# Patient Record
Sex: FEMALE | Race: WHITE | NOT HISPANIC OR LATINO | Employment: OTHER | URBAN - METROPOLITAN AREA
[De-identification: names, ages, dates, MRNs, and addresses within clinical notes are randomized per-mention and may not be internally consistent; named-entity substitution may affect disease eponyms.]

---

## 2023-04-10 ENCOUNTER — TELEPHONE (OUTPATIENT)
Dept: PRIMARY CARE | Facility: CLINIC | Age: 48
End: 2023-04-10
Payer: COMMERCIAL

## 2023-04-10 NOTE — TELEPHONE ENCOUNTER
Patient left a message- patient woke up with intense right breast pain. It still hurts to touch and painful. Patient did have left breast cancer and would like to have this examed for a lump. Please advise tn

## 2023-06-12 DIAGNOSIS — I10 ESSENTIAL HYPERTENSION: Primary | ICD-10-CM

## 2023-06-12 PROBLEM — E87.6 HYPOKALEMIA: Status: ACTIVE | Noted: 2023-06-12

## 2023-06-12 PROBLEM — Z20.822 EXPOSURE TO COVID-19 VIRUS: Status: ACTIVE | Noted: 2023-06-12

## 2023-06-12 PROBLEM — R93.89 ABNORMAL MRI: Status: ACTIVE | Noted: 2023-06-12

## 2023-06-12 PROBLEM — E07.89 THYROID FULLNESS: Status: ACTIVE | Noted: 2023-06-12

## 2023-06-12 PROBLEM — F41.0 PANIC ATTACKS: Status: ACTIVE | Noted: 2023-06-12

## 2023-06-12 PROBLEM — R92.8 MAMMOGRAM ABNORMAL: Status: ACTIVE | Noted: 2023-06-12

## 2023-06-12 PROBLEM — E78.00 ELEVATED LDL CHOLESTEROL LEVEL: Status: ACTIVE | Noted: 2023-06-12

## 2023-06-12 PROBLEM — R63.5 ABNORMAL WEIGHT GAIN: Status: ACTIVE | Noted: 2023-06-12

## 2023-06-12 PROBLEM — Z12.11 COLON CANCER SCREENING: Status: ACTIVE | Noted: 2023-06-12

## 2023-06-12 PROBLEM — R92.30 DENSE BREAST TISSUE ON MAMMOGRAM: Status: ACTIVE | Noted: 2023-06-12

## 2023-06-12 PROBLEM — E03.9 HYPOTHYROIDISM: Status: ACTIVE | Noted: 2023-06-12

## 2023-06-12 PROBLEM — R05.9 COUGH: Status: ACTIVE | Noted: 2023-06-12

## 2023-06-12 PROBLEM — D05.10 DCIS (DUCTAL CARCINOMA IN SITU): Status: ACTIVE | Noted: 2023-06-12

## 2023-06-12 PROBLEM — S33.6XXA SPRAIN, SACROILIAC: Status: ACTIVE | Noted: 2023-06-12

## 2023-06-12 PROBLEM — H04.123 DRY EYE SYNDROME OF BOTH EYES: Status: ACTIVE | Noted: 2023-06-12

## 2023-06-12 PROBLEM — E55.9 VITAMIN D DEFICIENCY: Status: ACTIVE | Noted: 2023-06-12

## 2023-06-12 PROBLEM — H81.10 VERTIGO, BENIGN POSITIONAL: Status: ACTIVE | Noted: 2023-06-12

## 2023-06-12 RX ORDER — LOSARTAN POTASSIUM 100 MG/1
1 TABLET ORAL DAILY
COMMUNITY
Start: 2021-08-16 | End: 2023-06-12 | Stop reason: DRUGHIGH

## 2023-06-12 RX ORDER — LEVOTHYROXINE SODIUM 50 UG/1
50 TABLET ORAL DAILY
COMMUNITY
End: 2023-10-09

## 2023-06-12 RX ORDER — HYDROCHLOROTHIAZIDE 50 MG/1
1 TABLET ORAL DAILY
COMMUNITY
Start: 2021-05-22 | End: 2023-08-24 | Stop reason: ALTCHOICE

## 2023-06-12 RX ORDER — ACETAMINOPHEN 500 MG
1 TABLET ORAL DAILY
COMMUNITY
Start: 2019-02-18

## 2023-06-12 RX ORDER — LEVOTHYROXINE SODIUM 25 UG/1
25 TABLET ORAL DAILY
COMMUNITY
Start: 2022-09-19 | End: 2023-08-24 | Stop reason: ALTCHOICE

## 2023-06-12 RX ORDER — ALBUTEROL SULFATE 90 UG/1
2 AEROSOL, METERED RESPIRATORY (INHALATION) EVERY 4 HOURS PRN
COMMUNITY
Start: 2023-01-04 | End: 2023-08-24 | Stop reason: ALTCHOICE

## 2023-06-12 RX ORDER — ASCORBIC ACID 500 MG
1 TABLET,CHEWABLE ORAL 2 TIMES DAILY
COMMUNITY
Start: 2019-02-18 | End: 2023-08-24 | Stop reason: ALTCHOICE

## 2023-06-12 RX ORDER — LANOLIN ALCOHOL/MO/W.PET/CERES
400 CREAM (GRAM) TOPICAL 2 TIMES DAILY
COMMUNITY
Start: 2019-08-16 | End: 2023-08-24 | Stop reason: ALTCHOICE

## 2023-06-12 RX ORDER — LOSARTAN POTASSIUM 50 MG/1
50 TABLET ORAL DAILY
Qty: 30 TABLET | Refills: 6 | Status: SHIPPED | OUTPATIENT
Start: 2023-06-12 | End: 2024-01-03 | Stop reason: SDUPTHER

## 2023-06-12 RX ORDER — MECLIZINE HYDROCHLORIDE 25 MG/1
1 TABLET ORAL 3 TIMES DAILY PRN
COMMUNITY
Start: 2022-04-13

## 2023-06-12 RX ORDER — MELATONIN 5 MG
1 CAPSULE ORAL NIGHTLY
COMMUNITY
Start: 2019-08-16

## 2023-06-12 RX ORDER — VALACYCLOVIR HYDROCHLORIDE 500 MG/1
500 TABLET, FILM COATED ORAL 2 TIMES DAILY
COMMUNITY
Start: 2013-09-16

## 2023-06-12 RX ORDER — LOSARTAN POTASSIUM 50 MG/1
50 TABLET ORAL DAILY
COMMUNITY
End: 2023-06-12 | Stop reason: DRUGHIGH

## 2023-07-26 ENCOUNTER — TELEPHONE (OUTPATIENT)
Dept: PRIMARY CARE | Facility: CLINIC | Age: 48
End: 2023-07-26
Payer: COMMERCIAL

## 2023-07-26 DIAGNOSIS — Z12.31 VISIT FOR SCREENING MAMMOGRAM: ICD-10-CM

## 2023-07-26 DIAGNOSIS — I10 ESSENTIAL HYPERTENSION: ICD-10-CM

## 2023-07-26 DIAGNOSIS — Z00.00 HEALTHCARE MAINTENANCE: Primary | ICD-10-CM

## 2023-08-16 ENCOUNTER — LAB (OUTPATIENT)
Dept: LAB | Facility: LAB | Age: 48
End: 2023-08-16
Payer: COMMERCIAL

## 2023-08-16 DIAGNOSIS — Z00.00 HEALTHCARE MAINTENANCE: ICD-10-CM

## 2023-08-16 LAB
ALANINE AMINOTRANSFERASE (SGPT) (U/L) IN SER/PLAS: 17 U/L (ref 7–45)
ALBUMIN (G/DL) IN SER/PLAS: 4.2 G/DL (ref 3.4–5)
ALKALINE PHOSPHATASE (U/L) IN SER/PLAS: 57 U/L (ref 33–110)
ANION GAP IN SER/PLAS: 11 MMOL/L (ref 10–20)
ASPARTATE AMINOTRANSFERASE (SGOT) (U/L) IN SER/PLAS: 20 U/L (ref 9–39)
BILIRUBIN TOTAL (MG/DL) IN SER/PLAS: 0.4 MG/DL (ref 0–1.2)
CALCIUM (MG/DL) IN SER/PLAS: 9.4 MG/DL (ref 8.6–10.3)
CARBON DIOXIDE, TOTAL (MMOL/L) IN SER/PLAS: 28 MMOL/L (ref 21–32)
CHLORIDE (MMOL/L) IN SER/PLAS: 104 MMOL/L (ref 98–107)
CHOLESTEROL (MG/DL) IN SER/PLAS: 241 MG/DL (ref 0–199)
CHOLESTEROL IN HDL (MG/DL) IN SER/PLAS: 40.5 MG/DL
CHOLESTEROL/HDL RATIO: 6
CREATININE (MG/DL) IN SER/PLAS: 0.9 MG/DL (ref 0.5–1.05)
ERYTHROCYTE DISTRIBUTION WIDTH (RATIO) BY AUTOMATED COUNT: 14.3 % (ref 11.5–14.5)
ERYTHROCYTE MEAN CORPUSCULAR HEMOGLOBIN CONCENTRATION (G/DL) BY AUTOMATED: 31.9 G/DL (ref 32–36)
ERYTHROCYTE MEAN CORPUSCULAR VOLUME (FL) BY AUTOMATED COUNT: 87 FL (ref 80–100)
ERYTHROCYTES (10*6/UL) IN BLOOD BY AUTOMATED COUNT: 4.39 X10E12/L (ref 4–5.2)
GFR FEMALE: 78 ML/MIN/1.73M2
GLUCOSE (MG/DL) IN SER/PLAS: 83 MG/DL (ref 74–99)
HEMATOCRIT (%) IN BLOOD BY AUTOMATED COUNT: 38.2 % (ref 36–46)
HEMOGLOBIN (G/DL) IN BLOOD: 12.2 G/DL (ref 12–16)
LDL: 173 MG/DL (ref 0–99)
LEUKOCYTES (10*3/UL) IN BLOOD BY AUTOMATED COUNT: 4.7 X10E9/L (ref 4.4–11.3)
PLATELETS (10*3/UL) IN BLOOD AUTOMATED COUNT: 322 X10E9/L (ref 150–450)
POTASSIUM (MMOL/L) IN SER/PLAS: 4.3 MMOL/L (ref 3.5–5.3)
PROTEIN TOTAL: 7 G/DL (ref 6.4–8.2)
SODIUM (MMOL/L) IN SER/PLAS: 139 MMOL/L (ref 136–145)
THYROTROPIN (MIU/L) IN SER/PLAS BY DETECTION LIMIT <= 0.05 MIU/L: 2.87 MIU/L (ref 0.44–3.98)
TRIGLYCERIDE (MG/DL) IN SER/PLAS: 138 MG/DL (ref 0–149)
UREA NITROGEN (MG/DL) IN SER/PLAS: 15 MG/DL (ref 6–23)
VLDL: 28 MG/DL (ref 0–40)

## 2023-08-16 PROCEDURE — 80053 COMPREHEN METABOLIC PANEL: CPT

## 2023-08-16 PROCEDURE — 36415 COLL VENOUS BLD VENIPUNCTURE: CPT

## 2023-08-16 PROCEDURE — 85027 COMPLETE CBC AUTOMATED: CPT

## 2023-08-16 PROCEDURE — 80061 LIPID PANEL: CPT

## 2023-08-16 PROCEDURE — 84443 ASSAY THYROID STIM HORMONE: CPT

## 2023-08-23 NOTE — PROGRESS NOTES
Subjective   Patient ID: Daysi Lloyd is a 48 y.o. female who presents for her annual physical      She joined  in April 2023.   She admits to eating more eggs with this diet.      She feels well, her energy levels are normal       HEALTH:  PAP 4/12 , 8/15 , 7/15/19 was - and HPV- so Q 2 with GYN, 7/19, 2021   Mammo 11/17 abnormal left breast and +DCIS and s/p mastectomy, 1/18 US, 7/19 + fat necrosis, 7/2020, 4/2021, 4/12/2023 she had a bilateral diagnostic mammogram with right ultrasound showing no evidence of malignancy; there is a benign area of fibrocystic tissue at 8 o'clock 8cmfn (correlates as palpated);   MRI breast 7/19 - 11/16/2020 -  BD never   Colonoscopy 8/2021 hyperplastic and Q 10   EKG 1/18 at [facility], 3/2020, 5/2021, 8/2023  Urine 8/19  Declines flu shot   TDAP 2011, 8/24/2023  MMR 8/16/19   Prevnar never   Pneumovax never   Shingrix recommended and will consider   Moderna CVD vaccine 2/10/2021 and 3/10/2021 booster 11/1/2021, 12/2022  Ophth She sees a doctor at  yearly and wears contacts. No history of glaucoma or MD        Review of Systems  All systems negative except those listed in the HPI      Past Medical, Surgical, and Family History reviewed and updated in chart.  Reviewed all medications by prescribing practitioner or clinical pharmacist   (such as prescriptions, OTCs, herbal therapies and supplements) and documented in the medical record      Objective   Visit Vitals  /60 (BP Location: Right arm, Patient Position: Sitting)   Pulse 60   Temp 36.3 °C (97.4 °F)    Body mass index is 34.06 kg/m².      Physical Exam  Vitals reviewed.   Constitutional:       Appearance: Normal appearance. She is obese.   HENT:      Head: Normocephalic.      Right Ear: Tympanic membrane, ear canal and external ear normal.      Left Ear: Tympanic membrane, ear canal and external ear normal.      Nose: Nose normal.      Mouth/Throat:      Pharynx: Oropharynx is clear.   Eyes:      Conjunctiva/sclera:  Conjunctivae normal.   Cardiovascular:      Rate and Rhythm: Normal rate and regular rhythm.      Pulses: Normal pulses.      Heart sounds: Normal heart sounds.   Pulmonary:      Effort: Pulmonary effort is normal.      Breath sounds: Normal breath sounds.   Chest:      Comments: Breast exam is normal, no adhering of left implant  Abdominal:      General: Bowel sounds are normal.      Palpations: Abdomen is soft.   Musculoskeletal:         General: Normal range of motion.      Cervical back: Normal range of motion and neck supple.   Skin:     General: Skin is warm.   Neurological:      General: No focal deficit present.      Mental Status: She is alert and oriented to person, place, and time.   Psychiatric:         Mood and Affect: Mood normal.         Behavior: Behavior normal.         Thought Content: Thought content normal.         Judgment: Judgment normal.       Assessment/Plan   Problem List Items Addressed This Visit       DCIS (ductal carcinoma in situ)    Relevant Orders    BI mammo right diagnostic tomosynthesis    Dense breast tissue on mammogram    Relevant Orders    BI mammo right diagnostic tomosynthesis     Other Visit Diagnoses       Healthcare maintenance    -  Primary    Relevant Orders    Tdap vaccine, age 10 years and older  (BOOSTRIX)            Annual physical completed  Reviewed her labs from 8/2023    [facility]  -  Discussed healthy diet and regular exercise.    -  Physical exam overall unremarkable. Immunizations reviewed and updated accordingly. Healthy lifestyle choices discussed (tobacco avoidance, appropriate alcohol use, avoidance of illicit substances).   -  Patient is wearing seatbelt.   -  Screening lab work ordered as indicated.    -  Age appropriate screening tests reviewed with patient.       Her weight in office today is 211 with BMI of 34.06. We spent 15 minutes discussing diet and weight loss. The struggle of weight loss persists    She joined  in 4/2023.    Healthier food choices discussed with patient today 8/2023    HTN onset of 5/21/2021: Stable.   Continue losartan 50 mg daily   She discontinued HCTZ thinking it caused loose stools   EKG was normal 8/2023, no LVH or strain pattern noted   She is monitoring her sodium intake   Recommend she monitor her BP at home and call with elevated readings     I have spent 15 min face to face with this patient discussing their cardiac risk and behavioral therapies of nutrition choices and exercise. We are trying to eliminate habits that are contributing to their cardiac risk.  We agreed on a plan of how they can reduce their current CV risk   The patient's  10 yr CV risk was estimated at  2.3 % 8/2023    Elevated lipids:  and HDL 40 on labs in 8/2023. She is eating more eggs since beginning WW in 4/2023. Recommend only eating egg whites 8/2023.   Explained goal for LDL to be less than 100 and ideally less than 70  I would like to see her HDL between 55- 60  This is definitely familial hypercholesteremia   She exercises daily and has stopped dairy products   Recommend she begin Metamucil 1 Tbsp daily and increase to BID as tolerated   Healthier food choices discussed with patient today 8/2023    Hyperthyroid: TSH 2.87 on labs in 8/2023  Continue levothyroxine 50 mcg daily.   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   She has been struggling with increased stressors for a couple of years   She has seen a therapist in the past with the onset/ treatment of breast cancer   Discontinue Wellbutrin   Continue Buspar 15 mg prn  She did FMLA from 5/4/2022 until 5/23/2022.   She saw Dr Ash in 5/2022 and Janel Kowalski in 5/2022     Abdominal bloating:   She inquires about doing food allergy testing. She can look  "into [facility] on line.   I do not believe this is a food intolerance issue based on her Sx.   Discussed MAT testing that would have to be ordered by an allergist   Colon 8/2021 hyperplastic and Q 10   US of abdomen 10/2022 Hepatic steatosis. Otherwise, grossly unremarkable ultrasound of the abdomen.    Abdominal plasty and breast surgery. Healed well.   She has a hard spot in her abdomen area and she feels like a \"wave\" sensation throughout intermittently   She has these episodes approx once a week   Demonstrated how to massage the abdomen     Vit D def: Levels 49 on labs in 9/2022   Continue OTC Vitamin D 5000 iu a day and Mg daily   Continue OTC Vitamin B 12 SL 1000- 5000 UT daily     PAP in 2021 with Gyn and HPV- so Q 5     Abnormal mammogram on 11/22/17, left breast biopsy 1/4/18 yielding DCIS, ER+DC+ and seen by Dr Rosales   - S/p Left simple mastectomy 2/28/18 and LN negative   She has had reconstruction surgeries since than with Dr Hall in 6/18 and last will be 9/18 and nipple reconstruction than. She declines Tamoxifen due to low risk at 5% of recurrence. She is not on any hormone blockers.   She had reconstruction of the left breast on 9/19 and final will be tattooing of the nipple. She is not sure she wants to undergo any further surgeries at this time.   Genetic testing was done, all negative BRCA genes in 2018. She is receptor positive. Her recurrence is low at 5%.  CT images 1/2023, small metallic objects are present. These are probably surgical clips. Temporary AlloDerm was used and then subsequently removed. Diastasis rectus is apparent and no abdominal organomegaly, masses or lymphadenopathy are apparent. There are no hernias. She is reassured. These do not cause any pain and she is not having any pain or bulging at any point.   She saw Dr Oakes in 7/2023 and per notes: She was previously seen 4/2023 for evaluation of right breast pain. 4/12/2023 she had a bilateral diagnostic mammogram with " right ultrasound showing no evidence of malignancy; there is a benign area of fibrocystic tissue at 8 o'clock 8cmfn (correlates as palpated); a one year screening mammogram was recommended. Since our last visit, her previous symptoms have completely resolved   Breast exam is normal, no adhering of left implant 8/2023    Colon 8/2021 hyperplastic and Q 10   BD at 53     Ophth:   She sees a doctor at  yearly and wears contacts. No history of glaucoma or MD  She will have her next eye exam faxed to my office in order to update her medical records.    Declines flu shot   TDAP 2011, 8/24/2023  MMR 8/16/19   Prevnar never   Pneumovax never   Shingrix recommended and will consider      Some elements in the chart were copied from Dr. Benson's last office visit with patient.   Notes have been updated where appropriate, and reflect my current medical decision making from today.     RTC in 6 months for follow up or sooner if needed   (CPE due 8/2024)     Scribe Attestation  By signing my name below, I, Precious Riddle , Scribe   attest that this documentation has been prepared under the direction and in the presence of Ayana Benson MD.

## 2023-08-24 ENCOUNTER — OFFICE VISIT (OUTPATIENT)
Dept: PRIMARY CARE | Facility: CLINIC | Age: 48
End: 2023-08-24
Payer: COMMERCIAL

## 2023-08-24 VITALS
SYSTOLIC BLOOD PRESSURE: 110 MMHG | HEIGHT: 66 IN | WEIGHT: 211 LBS | OXYGEN SATURATION: 99 % | BODY MASS INDEX: 33.91 KG/M2 | TEMPERATURE: 97.4 F | HEART RATE: 60 BPM | DIASTOLIC BLOOD PRESSURE: 60 MMHG

## 2023-08-24 DIAGNOSIS — R92.30 DENSE BREAST TISSUE ON MAMMOGRAM: ICD-10-CM

## 2023-08-24 DIAGNOSIS — E03.9 HYPOTHYROIDISM, UNSPECIFIED TYPE: ICD-10-CM

## 2023-08-24 DIAGNOSIS — D05.12 DUCTAL CARCINOMA IN SITU (DCIS) OF LEFT BREAST: ICD-10-CM

## 2023-08-24 DIAGNOSIS — Z00.00 HEALTHCARE MAINTENANCE: Primary | ICD-10-CM

## 2023-08-24 DIAGNOSIS — I10 ESSENTIAL HYPERTENSION: ICD-10-CM

## 2023-08-24 DIAGNOSIS — H50.89: ICD-10-CM

## 2023-08-24 DIAGNOSIS — H04.123 DRY EYE SYNDROME OF BOTH EYES: ICD-10-CM

## 2023-08-24 DIAGNOSIS — E78.00 ELEVATED LDL CHOLESTEROL LEVEL: ICD-10-CM

## 2023-08-24 PROBLEM — I96 SKIN FLAP NECROSIS (MULTI): Status: ACTIVE | Noted: 2018-03-15

## 2023-08-24 PROBLEM — Z90.10 ABSENCE OF BREAST: Status: ACTIVE | Noted: 2018-07-03

## 2023-08-24 PROBLEM — R05.9 COUGH: Status: RESOLVED | Noted: 2023-06-12 | Resolved: 2023-08-24

## 2023-08-24 PROBLEM — R79.89 LOW VITAMIN D LEVEL: Status: ACTIVE | Noted: 2023-08-24

## 2023-08-24 PROBLEM — E78.5 HYPERLIPIDEMIA: Status: ACTIVE | Noted: 2018-08-15

## 2023-08-24 PROBLEM — N64.89 BREAST ASYMMETRY: Status: ACTIVE | Noted: 2018-07-03

## 2023-08-24 PROBLEM — E78.5 HYPERLIPIDEMIA: Status: RESOLVED | Noted: 2018-08-15 | Resolved: 2023-08-24

## 2023-08-24 PROBLEM — T86.828 SKIN FLAP NECROSIS (MULTI): Status: ACTIVE | Noted: 2018-03-15

## 2023-08-24 PROBLEM — F41.0 PANIC ATTACKS: Status: RESOLVED | Noted: 2023-06-12 | Resolved: 2023-08-24

## 2023-08-24 PROBLEM — T86.828 SKIN FLAP NECROSIS (MULTI): Status: RESOLVED | Noted: 2018-03-15 | Resolved: 2023-08-24

## 2023-08-24 PROBLEM — R79.89 LOW VITAMIN D LEVEL: Status: RESOLVED | Noted: 2023-08-24 | Resolved: 2023-08-24

## 2023-08-24 PROBLEM — N60.11: Status: ACTIVE | Noted: 2019-02-25

## 2023-08-24 PROBLEM — S33.6XXA SPRAIN, SACROILIAC: Status: RESOLVED | Noted: 2023-06-12 | Resolved: 2023-08-24

## 2023-08-24 PROBLEM — C50.919 MALIGNANT NEOPLASM OF BREAST (MULTI): Status: ACTIVE | Noted: 2018-01-26

## 2023-08-24 PROBLEM — Z20.822 EXPOSURE TO COVID-19 VIRUS: Status: RESOLVED | Noted: 2023-06-12 | Resolved: 2023-08-24

## 2023-08-24 PROBLEM — I96 SKIN FLAP NECROSIS (MULTI): Status: RESOLVED | Noted: 2018-03-15 | Resolved: 2023-08-24

## 2023-08-24 PROBLEM — Z98.890 HISTORY OF BREAST RECONSTRUCTION: Status: ACTIVE | Noted: 2018-02-02

## 2023-08-24 PROBLEM — H52.4 PRESBYOPIA OF BOTH EYES: Status: ACTIVE | Noted: 2020-01-27

## 2023-08-24 PROCEDURE — 90471 IMMUNIZATION ADMIN: CPT | Performed by: INTERNAL MEDICINE

## 2023-08-24 PROCEDURE — 93000 ELECTROCARDIOGRAM COMPLETE: CPT | Performed by: INTERNAL MEDICINE

## 2023-08-24 PROCEDURE — 1036F TOBACCO NON-USER: CPT | Performed by: INTERNAL MEDICINE

## 2023-08-24 PROCEDURE — 3078F DIAST BP <80 MM HG: CPT | Performed by: INTERNAL MEDICINE

## 2023-08-24 PROCEDURE — 99396 PREV VISIT EST AGE 40-64: CPT | Performed by: INTERNAL MEDICINE

## 2023-08-24 PROCEDURE — 90715 TDAP VACCINE 7 YRS/> IM: CPT | Performed by: INTERNAL MEDICINE

## 2023-08-24 PROCEDURE — 3074F SYST BP LT 130 MM HG: CPT | Performed by: INTERNAL MEDICINE

## 2023-10-08 DIAGNOSIS — E03.9 HYPOTHYROIDISM, UNSPECIFIED TYPE: Primary | ICD-10-CM

## 2023-10-09 DIAGNOSIS — E03.9 HYPOTHYROIDISM, UNSPECIFIED TYPE: ICD-10-CM

## 2023-10-09 RX ORDER — LEVOTHYROXINE SODIUM 50 UG/1
50 TABLET ORAL DAILY
Qty: 90 TABLET | Refills: 0 | Status: SHIPPED | OUTPATIENT
Start: 2023-10-09 | End: 2023-10-09 | Stop reason: SDUPTHER

## 2023-10-09 RX ORDER — LEVOTHYROXINE SODIUM 50 UG/1
50 TABLET ORAL DAILY
Qty: 90 TABLET | Refills: 1 | Status: SHIPPED | OUTPATIENT
Start: 2023-10-09

## 2023-11-15 ENCOUNTER — TELEPHONE (OUTPATIENT)
Dept: PRIMARY CARE | Facility: CLINIC | Age: 48
End: 2023-11-15
Payer: COMMERCIAL

## 2023-11-15 NOTE — TELEPHONE ENCOUNTER
"Pt of Dr Benson. Tested positive for covid and is asking for paxlovid to \"simply\" be called in for her. She was very insistent and did not seem to understand a message was being placed to your attention for you to advise if she was going to need a possible virtual or not before meds could be advised. Unfortunately we do not have anything available today and she just wanted meds to be sent over by you. Pt is using [facility] in [place] if anything is sent over. Please advise. Thank you!  "

## 2023-11-16 NOTE — TELEPHONE ENCOUNTER
Talked with her and had the paxlovid given to her   She is having fever and chills and really weak   Lost sense of taste   Aware of the risk of rebound   Will rest and hydrate well

## 2024-01-03 DIAGNOSIS — I10 ESSENTIAL HYPERTENSION: ICD-10-CM

## 2024-01-03 RX ORDER — LOSARTAN POTASSIUM 50 MG/1
50 TABLET ORAL DAILY
Qty: 30 TABLET | Refills: 6 | Status: SHIPPED | OUTPATIENT
Start: 2024-01-03

## 2024-02-27 ENCOUNTER — TELEPHONE (OUTPATIENT)
Dept: PRIMARY CARE | Facility: CLINIC | Age: 49
End: 2024-02-27
Payer: COMMERCIAL

## 2024-02-27 NOTE — TELEPHONE ENCOUNTER
She wonders if she needs to be seen , she wants a referral for weight loss and dietician please call and let her know what she needs to do. She tried a variety of things for 10 months and nothing is helping.

## 2024-02-28 NOTE — PROGRESS NOTES
Subjective   Patient ID: Daysi Lloyd is a 48 y.o. female who is having a virtual visit for her 6 month follow up and discussion about weight loss     She is not happy with her weight.  She would like a referral to someone for weight loss.          HEALTH:  PAP 4/12, 8/15, 7/19- and HPV-, 7/19, 2021 -  Mammo 11/17 abnormal left breast and +DCIS and s/p mastectomy, 1/18 US, 7/19 + fat necrosis, 7/2020, 4/2021, 4/12/2023 she had a bilateral diagnostic mammogram with right ultrasound showing no evidence of malignancy; there is a benign area of fibrocystic tissue at 8 o'clock 8cmfn (correlates as palpated); 6/2023-   MRI breast 7/19 - 11/16/2020 -, 12/2023  BD never   Colonoscopy 8/2021 hyperplastic and Q 10   EKG 1/18, 3/2020, 5/2021, 8/2023  Urine 8/19  Declines flu shot   TDAP 2011, 8/24/2023  MMR 8/16/19   Prevnar never   Pneumovax never   Shingrix recommended and will consider   Moderna CVD vaccine 2/2021 and 3/2021 booster 11/2021, 12/2022  Ophth She sees a doctor at  yearly and wears contacts. No history of glaucoma or MD       Review of Systems  All systems negative except those listed in the HPI      Objective   There were no vitals taken for this visit.   Body mass index is 32.6 kg/m².     Physical Exam-- No PE done due to this being a virtual visit     Assessment/Plan   Problem List Items Addressed This Visit    None  Visit Diagnoses       BMI 32.0-32.9,adult    -  Primary             Follow up completed  Reviewed her labs from 8/2023     An interactive audio and video telecommunication system which permits real time communications between the patient (at the originating site) and provider (at the distant site) was utilized to provide this telehealth service.   Verbal consent was requested and obtained from Daysi Lloyd on this date, 02/29/24 for a telehealth visit.    She is  with 3 children   She denies previous history of tobacco use     Her weight is 202 pounds with BMI of 32.60. We spent  15 minutes discussing diet and weight loss. The struggle of weight loss persists    She is not happy with her weight.  She would like a referral to someone for weight loss.   She is not sure if her weight gain is hormonally induced   Discussed seeing Dr Mittal versus seeing functional medicine   She can talk with Gyn for the hormonal issues if she chooses   Discussed hormonal changes to patient in detail. Explained this is the next phase in her life, she needs to embrace it   Orders placed for referral for dietician consult, weight management and weight loss  2/2024      HTN onset of 5/21/2021: Stable.   Continue losartan 50 mg daily   She discontinued HCTZ thinking it caused loose stools   EKG was normal 8/2023, no LVH or strain pattern noted   She is monitoring her sodium intake   Recommend she monitor her BP at home and call with elevated readings      I have spent 15 min face to face with this patient discussing their cardiac risk and behavioral therapies of nutrition choices and exercise. We are trying to eliminate habits that are contributing to their cardiac risk.  We agreed on a plan of how they can reduce their current CV risk   The patient's  10 yr CV risk was estimated at  2.3 % 2/2024     Elevated lipids:  and HDL 40 on labs in 8/2023.   Explained goal for LDL to be less than 100 and ideally less than 70  I would like to see her HDL between 55- 60  This is definitely familial hypercholesteremia   She exercises daily and has stopped dairy products   Recommend she begin Metamucil 1 Tbsp daily and increase to BID as tolerated   Healthier food choices discussed with patient 8/2023     Hyperthyroid: TSH 2.87 on labs in 8/2023  Continue levothyroxine 50 mcg daily.   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   She has been struggling with increased stressors for a couple of years   She has seen a therapist in the past with the onset/ treatment of breast cancer   Discontinue Wellbutrin   Continue Buspar 15 mg prn  She did FMLA from 5/4/2022 until 5/23/2022.   She saw Dr Ash in 5/2022 and Janel Kowalski in 5/2022      Abdominal bloating:   She inquires about doing food allergy testing. She can look into [facility] on line.   I do not believe this is a food intolerance issue based on her Sx.   Discussed MAT testing that would have to be ordered by an allergist   Colon 8/2021 hyperplastic and Q 10   US of abdomen 10/2022 Hepatic steatosis. Otherwise, grossly unremarkable ultrasound of the abdomen.     Abdominal plasty and breast surgery. Healed well.   She has a hard spot in her abdomen area and she feels like a \"wave\" sensation throughout intermittently   She has these episodes approx once a week   Demonstrated how to massage the abdomen      Vit D def: Levels 49 on labs in 9/2022   Continue OTC Vitamin D 5000 iu a day and Mg daily   Continue OTC Vitamin B 12 SL 1000- 5000 UT daily     PAP in 2021 with Gyn and HPV- so Q 5      Abnormal mammogram on 11/22/17, left breast biopsy 1/4/18 yielding DCIS, ER+KS+ and seen by Dr Rosales   ER+ DCIS Stage 0 LEFT Breast Cancer, SP mastectomy with BABITA reconstruction   - S/p Left simple mastectomy 2/28/18 and LN negative   She had reconstruction surgeries with Dr Hall in 6/18 and last in 9/18.  She had reconstruction of the left breast on 9/19 and final will be tattooing of the nipple. She is not sure she wants to undergo any further surgeries at this time.   She declines Tamoxifen due to low risk at 5% of recurrence.   She is not on any hormone blockers.   Genetic testing was done, all negative BRCA genes in 2018. She is receptor positive.   CT images 1/2023, small metallic objects are present. These are probably surgical clips. Temporary AlloDerm was used " and then subsequently removed. Diastasis rectus is apparent and no abdominal organomegaly, masses or lymphadenopathy are apparent. There are no hernias. She is reassured. These do not cause any pain and she is not having any pain or bulging at any point.   She saw Dr Oakes in 7/2023 and per notes:   Bilateral diagnostic mammogram with right ultrasound 4/2023 showing no evidence of malignancy; there is a benign area of fibrocystic tissue at 8 o'clock 8cmfn (correlates as palpated)  MRI breasts 12/2023 normal   Breast exam is normal, no adhering of left implant 8/2023  She saw KARLO Oconnell in 1/2024 exam normal and will return in 6/2024 for mammo and follow up     Colon 8/2021 hyperplastic and Q 10   BD at 53      Ophth:   She sees a doctor at  yearly and wears contacts. No history of glaucoma or MD  She will have her next eye exam faxed to my office in order to update her medical records.     Declines flu shot   TDAP 2011, 8/24/2023  MMR 8/16/19   Prevnar never   Pneumovax never   Shingrix recommended and will consider   Moderna CVD vaccine 2/2021 and 3/2021 booster 11/2021, 12/2022     Some elements in the chart were copied from Dr. Benson's last office visit with patient.   Notes have been updated where appropriate, and reflect my current medical decision making from today.      RTC in 6 months for follow up or sooner if needed   (CPE due 8/2024)      Scribe Attestation  By signing my name below, I, Precious Riddle , Scribe   attest that this documentation has been prepared under the direction and in the presence of Ayana Benson MD.

## 2024-02-29 ENCOUNTER — TELEMEDICINE (OUTPATIENT)
Dept: PRIMARY CARE | Facility: CLINIC | Age: 49
End: 2024-02-29
Payer: COMMERCIAL

## 2024-02-29 VITALS — HEIGHT: 66 IN | WEIGHT: 202 LBS | BODY MASS INDEX: 32.47 KG/M2

## 2024-02-29 DIAGNOSIS — C50.919 MALIGNANT NEOPLASM OF FEMALE BREAST, UNSPECIFIED ESTROGEN RECEPTOR STATUS, UNSPECIFIED LATERALITY, UNSPECIFIED SITE OF BREAST (MULTI): ICD-10-CM

## 2024-02-29 DIAGNOSIS — E03.9 HYPOTHYROIDISM, UNSPECIFIED TYPE: ICD-10-CM

## 2024-02-29 PROCEDURE — 3008F BODY MASS INDEX DOCD: CPT | Performed by: INTERNAL MEDICINE

## 2024-02-29 PROCEDURE — 1036F TOBACCO NON-USER: CPT | Performed by: INTERNAL MEDICINE

## 2024-02-29 PROCEDURE — 99213 OFFICE O/P EST LOW 20 MIN: CPT | Performed by: INTERNAL MEDICINE

## 2024-02-29 ASSESSMENT — PATIENT HEALTH QUESTIONNAIRE - PHQ9
2. FEELING DOWN, DEPRESSED OR HOPELESS: NOT AT ALL
SUM OF ALL RESPONSES TO PHQ9 QUESTIONS 1 AND 2: 0
1. LITTLE INTEREST OR PLEASURE IN DOING THINGS: NOT AT ALL

## 2024-02-29 NOTE — PATIENT INSTRUCTIONS
Obesity is a chronic, relapsing, progressive, treatable, multifactorial, neurobehavioral disease, where in an increase in body fat promotes adipose (fat) tissue dysfunction, as well as biomechanical stress on the body which can result in adverse metabolic, biomechanical, and psychosocial health consequences, in addition to reducing quality of life and lifespan.   Without treatment, obesity is likely to progress and worsen, further increase the patient's risk for numerous health conditions caused by excess adiposity and increasing the risk for premature death.     - Counseling provided on impact of diet, physical activity, stress & sleep in treatment of obesity.    - Counseled on reduced calorie, high protein, high fiber, whole foods diet.  Counseling on benefits of avoidance of frequent intake of processed foods and liquid calories.   - Counseled on behavioral modification strategies and tools to support weight loss.   - Reviewed pathophysiology of obesity in context of relationship to nutrition, energy balance and environmental factors that influence nutrition and energy balance outcomes.  - Counseled on various behavioral strategies and self monitoring practices to enhance understanding and individual assessment of energy balance, how various changes in types of foods consumed and macronutrient distribution can impact appetite regulation and be used as a tool in treatment of obesity.       The ability to age comfortably depends on how you invest in your body.   Include physical activity in your daily routine. Physical activity increases blood flow to your whole body, including your brain. ...  Eat a healthy diet. A heart-healthy diet may benefit your brain.   Stay mentally active. Be social.   Treat cardiovascular disease.   No smoking, excessive EtOH intake or illicit drug use.

## 2024-03-25 ENCOUNTER — APPOINTMENT (OUTPATIENT)
Dept: NUTRITION | Facility: CLINIC | Age: 49
End: 2024-03-25
Payer: COMMERCIAL

## 2024-04-05 ENCOUNTER — NUTRITION (OUTPATIENT)
Dept: NUTRITION | Facility: CLINIC | Age: 49
End: 2024-04-05
Payer: COMMERCIAL

## 2024-04-05 VITALS — BODY MASS INDEX: 32.6 KG/M2 | HEIGHT: 66 IN

## 2024-04-05 DIAGNOSIS — Z71.3 DIETARY COUNSELING: ICD-10-CM

## 2024-04-05 DIAGNOSIS — E66.9 OBESITY, UNSPECIFIED CLASSIFICATION, UNSPECIFIED OBESITY TYPE, UNSPECIFIED WHETHER SERIOUS COMORBIDITY PRESENT: Primary | ICD-10-CM

## 2024-04-05 PROCEDURE — 97802 MEDICAL NUTRITION INDIV IN: CPT | Performed by: DIETITIAN, REGISTERED

## 2024-04-05 NOTE — PATIENT INSTRUCTIONS
1) Consider following a Mediterranean Diet approach to help improve health and reduce risk/manage chronic disease. Focus on eating more unprocessed foods including fruits, vegetables, whole grains, legumes, nuts, fish, poultry, eggs, low fat cheese and dairy (cottage cheese, greek yogurt, and kefir). Include variety and color in your diet with fresh or frozen varieties of fruits and vegetables regularly included at your meals.  2) Decrease intake of processed foods with added fats, sugar, and salt. Reduce salt or sodium intake to less than 1500mg of sodium per day. Instead of adding table salt for flavor, use more herbs (basil, thyme, mint, parsley, cilantro, and dill), seasonings (cinnamon, cloves, oregano, fennel seed, and chili powder) and other flavors (kirti, garlic, and turmeric) when cooking. Use vegetable oil such as olive, canola, safflower, soybean and corn instead of butter, lard, or whole-fat dairy sources when cooking.  3) Eat less red meat including lean beef, pork or lamb to only a few times per month. Lean poultry including skinless chicken and turkey can be eaten a few times per week with a serving being 2-3 ounces. Focus on plant based protein sources such as nuts (almonds, walnuts, and pistachios), seeds (sunflower, flax, celine), legumes (navy beans, kidney beans, black beans, chickpeas, and lentils), tofu, tempeh, and soy products.  4) Eat more fresh or canned fish including shellfish, salmon, tuna, sardines and herring several times a week. These contain beneficial omega-3 fatty acids and have a low saturated fat content.  5) Choose more whole grains for their added fiber content. Whole grains include 100% whole wheat bread products, brown rice, popcorn, oatmeal, quinoa, and couscous. Try to eat fruit for something sweet such as fruit parfait made with unsweetened greek yogurt and homemade granola or low sugar fruit desserts.  6) Include physical activity into your day with a goal to meet 150  minutes of moderate-intensity aerobic activity (walking, biking, swimming, or housework) every week. Strength-training or weight-bearing exercise should be included twice a week if you are physically able to.

## 2024-04-05 NOTE — PROGRESS NOTES
Reason for Nutrition Visit:  Pt is a 49 y.o. female being seen for initial apt at [facility] referred for   1. Obesity, unspecified classification, unspecified obesity type, unspecified whether serious comorbidity present           Past Medical Hx:  Patient Active Problem List   Diagnosis    Abnormal MRI    Abnormal weight gain    Colon cancer screening    DCIS (ductal carcinoma in situ)    Dense breast tissue on mammogram    Dry eye syndrome of both eyes    Elevated LDL cholesterol level    Essential hypertension    Hypokalemia    Hypothyroidism    Mammogram abnormal    Thyroid fullness    Vertigo, benign positional    Vitamin D deficiency    Absence of breast    Breast asymmetry    Breast changes, fibrocystic, right    Dermatochalasis    History of breast reconstruction    Malignant neoplasm of breast (CMS/HCC)    Myopia of both eyes    Presbyopia of both eyes    Type 2 Duane retraction syndrome    Wears contact lenses     Lab Results   Component Value Date    CHOL 241 (H) 08/16/2023    TRIG 138 08/16/2023    HDL 40.5 08/16/2023    LDLF 173 (H) 08/16/2023      Food and Nutrition Hx:  Pt says she has been trying to lose weight but hasn't seen results like she would in the past. She did whole 30 in February and wants to do that again in April. She tries to follow WW most days as well. She does yoga 2x per week, kettle ball exercises every other day and walks 10,000 steps daily. She feels like she gets bloating often. She has been trying to IF from 10am to 6pm. 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 She might snack on air popped popcorn.    24 Diet Recall:  Meal 1: avocado and pc of ekekial bread  Meal 2: salad with shrimp, vegetable oil and vinegar  Meal 3: hawaiian fabiano roll with ham and wedding soup  Beverages: 64 oz water, Poppi occasionally, 1 cup of mushroom coffee, 1 cup regular coffee, 2 etoh drinks usually wine or bourbon when eating out    Allergies: None  Intolerance:  None  Intake: >75%  GI Symptoms : bloating Frequency: intermittent  Swallowing Difficulty: No problems with swallowing  Dentition : own    Types of Activities: Walking, Yoga, and kettle ball exercises every other day; plus a restaurant downtown      Sleep duration/quality : 7+ hours and disrupted sleep  Sleep disorders: none    Supplements: Multivitamin, Vitamin D, and Glucasamine  daily    Appetite: Good  Feelings of Hunger?: No hunger  Physical Feeling: Physically full  Binging: Occasionally  Cravings: Sweet and Salty  Energy Levels: Stable    Food Preparation: Patient and Partner/Spouse  Cooking Skills/Barriers: None reported  Grocery Shopping: Patient and Partner/Spouse  No difficulty affording food  Eating Out Type: Restaurant  1 times per week  Convenience Foods: Denies     Nutrition Focused Physical Exam:    Performed/Deferred: Deferred as pt visually appears well-nourished with no signs of malnutrition    Estimated Nutrient Needs:    Total Energy Estimated Needs (kCal): 1607 kCal   Method for Estimating Needs: MSJ: 1548x1.2-250   Total Protein Estimated Needs (g): 73.46 g   Total Protein Estimated Needs (g/kg): 0.8 g/kg    Nutrition Diagnosis:    Diagnosis Statement 1:  Diagnosis Status: New  Diagnosis : Altered nutrition related lab values  related to  metabolic dysfunction  as evidenced by  high cholesterol (241) and LDL (173) on 08/16/2023.    Diagnosis Statement 2:  Diagnosis Status: New  Diagnosis : Food and nutrition related knowledge deficit related to lacks motivation and/or readiness to apply or support systems change as evidenced by no prior knowledge of need for food- and nutrition-related recommendations    Diagnosis Statement 3:   Diagnosis Status: New  Diagnosis : Inadequate fiber intake  related to food and nutrition related knowledge deficit concerning desirable quantities of fiber as evidenced by estimated intake of fiber that is insufficient when compared to recommended amounts (38 g/day  for men and 25 g/day for women)    Nutrition Interventions:  Anti-Inflammatory Diet, Decreased Sodium Diet, Increased Fluid Intake, Increased Omega-3 Diet, Increased Protein Diet, Increased Vitamin D Intake, Low Saturated Fat Diet, and Mediterranean Diet  Monitoring&Evaluation: Estimated Energy Intake, Amount of Food - Estimated, Meal/Snack Pattern - Estimated, Estimated Protein Intake, Measured Fiber Intake, Complimentary/Alternative Medicine, and Food and Nutrition Skill  Nutrition Counseling: Motivational Interviewing  Coordination of Care: None    Nutrition Goals:  Nutrition Goals : Adequate fluid intake: 64 oz daily  Decrease intake of added sugars  Decrease intake of saturated fats  Decrease sodium intake  Increase awareness and respond to hunger cues  Increase awareness and respond to satiety cues  Reduce Kcal Intake  Reduce LDL level  Weight Loss    Nutrition Recommendations:  Via teach back method patient verbalized understanding of the following topics:  1) Make a plate that is 1/2 filled with non-starchy vegetables (any vegetable other than potatoes, peas or corn), 1/4 filled with whole grain/starch and 1/4 lean protein. This will help increase fiber intake and keep you full after eating.  2) Always include a protein food with snacks to make you feel thomas and reduce appetite. Try adding protein foods like peanut butter, nuts, low fat cheese, eggs, yogurt, milk, fish or meat to snacks.  3) Advised pt to add more fiber from complex carbohydrates to salads such as sweet potato, beans, or chickpeas.  4) Increase protein with breakfast with ideas like greek yogurt with fruit, nuts or seeds, or cottage with avocado and sprouted toast.    Educational Handouts: ADA Placemat and Greek Yogurt Bowl, ACLM Snacks, Egg Frittatas, Avocado Cottage Cheese Toast     Readiness to Change : Fair  Level of Understanding : Fair  Anticipated Compliant : Fair

## 2024-04-22 ENCOUNTER — APPOINTMENT (OUTPATIENT)
Dept: NUTRITION | Facility: CLINIC | Age: 49
End: 2024-04-22
Payer: COMMERCIAL

## 2024-05-02 ENCOUNTER — APPOINTMENT (OUTPATIENT)
Dept: NUTRITION | Facility: CLINIC | Age: 49
End: 2024-05-02
Payer: COMMERCIAL

## 2024-07-10 DIAGNOSIS — E03.9 HYPOTHYROIDISM, UNSPECIFIED TYPE: ICD-10-CM

## 2024-07-10 RX ORDER — LEVOTHYROXINE SODIUM 50 UG/1
50 TABLET ORAL DAILY
Qty: 90 TABLET | Refills: 1 | Status: SHIPPED | OUTPATIENT
Start: 2024-07-10

## 2024-07-10 NOTE — TELEPHONE ENCOUNTER
Pt called stating left message on Monday for refill of levothyroxine, nothing was called in.    Pt has 3 pills left    Pharmacy=  in [place]    Please call pt at   [phone]

## 2024-08-01 ENCOUNTER — TELEPHONE (OUTPATIENT)
Dept: PRIMARY CARE | Facility: CLINIC | Age: 49
End: 2024-08-01
Payer: COMMERCIAL

## 2024-08-01 DIAGNOSIS — Z00.00 HEALTHCARE MAINTENANCE: Primary | ICD-10-CM

## 2024-08-05 DIAGNOSIS — I10 ESSENTIAL HYPERTENSION: ICD-10-CM

## 2024-08-05 RX ORDER — LOSARTAN POTASSIUM 50 MG/1
50 TABLET ORAL DAILY
Qty: 30 TABLET | Refills: 11 | Status: SHIPPED | OUTPATIENT
Start: 2024-08-05

## 2024-08-28 ENCOUNTER — APPOINTMENT (OUTPATIENT)
Dept: PRIMARY CARE | Facility: CLINIC | Age: 49
End: 2024-08-28
Payer: COMMERCIAL

## 2024-08-30 ENCOUNTER — LAB (OUTPATIENT)
Dept: LAB | Facility: LAB | Age: 49
End: 2024-08-30
Payer: COMMERCIAL

## 2024-08-30 DIAGNOSIS — Z00.00 HEALTHCARE MAINTENANCE: ICD-10-CM

## 2024-08-30 LAB
ALBUMIN SERPL BCP-MCNC: 4.1 G/DL (ref 3.4–5)
ALP SERPL-CCNC: 68 U/L (ref 33–110)
ALT SERPL W P-5'-P-CCNC: 14 U/L (ref 7–45)
ANION GAP SERPL CALC-SCNC: 11 MMOL/L (ref 10–20)
AST SERPL W P-5'-P-CCNC: 18 U/L (ref 9–39)
BILIRUB SERPL-MCNC: 0.6 MG/DL (ref 0–1.2)
BUN SERPL-MCNC: 14 MG/DL (ref 6–23)
CALCIUM SERPL-MCNC: 9.6 MG/DL (ref 8.6–10.6)
CHLORIDE SERPL-SCNC: 103 MMOL/L (ref 98–107)
CHOLEST SERPL-MCNC: 221 MG/DL (ref 0–199)
CHOLESTEROL/HDL RATIO: 4.6
CO2 SERPL-SCNC: 29 MMOL/L (ref 21–32)
CREAT SERPL-MCNC: 0.9 MG/DL (ref 0.5–1.05)
EGFRCR SERPLBLD CKD-EPI 2021: 78 ML/MIN/1.73M*2
ERYTHROCYTE [DISTWIDTH] IN BLOOD BY AUTOMATED COUNT: 15.2 % (ref 11.5–14.5)
GLUCOSE SERPL-MCNC: 87 MG/DL (ref 74–99)
HCT VFR BLD AUTO: 36.8 % (ref 36–46)
HDLC SERPL-MCNC: 47.6 MG/DL
HGB BLD-MCNC: 11.6 G/DL (ref 12–16)
LDLC SERPL CALC-MCNC: 135 MG/DL
MCH RBC QN AUTO: 25.7 PG (ref 26–34)
MCHC RBC AUTO-ENTMCNC: 31.5 G/DL (ref 32–36)
MCV RBC AUTO: 82 FL (ref 80–100)
NON HDL CHOLESTEROL: 173 MG/DL (ref 0–149)
NRBC BLD-RTO: 0 /100 WBCS (ref 0–0)
PLATELET # BLD AUTO: 330 X10*3/UL (ref 150–450)
POTASSIUM SERPL-SCNC: 4.4 MMOL/L (ref 3.5–5.3)
PROT SERPL-MCNC: 7.1 G/DL (ref 6.4–8.2)
RBC # BLD AUTO: 4.51 X10*6/UL (ref 4–5.2)
SODIUM SERPL-SCNC: 139 MMOL/L (ref 136–145)
TRIGL SERPL-MCNC: 191 MG/DL (ref 0–149)
TSH SERPL-ACNC: 2.39 MIU/L (ref 0.44–3.98)
VLDL: 38 MG/DL (ref 0–40)
WBC # BLD AUTO: 6.1 X10*3/UL (ref 4.4–11.3)

## 2024-08-30 PROCEDURE — 85027 COMPLETE CBC AUTOMATED: CPT

## 2024-08-30 PROCEDURE — 80061 LIPID PANEL: CPT

## 2024-08-30 PROCEDURE — 80053 COMPREHEN METABOLIC PANEL: CPT

## 2024-08-30 PROCEDURE — 36415 COLL VENOUS BLD VENIPUNCTURE: CPT

## 2024-08-30 PROCEDURE — 84443 ASSAY THYROID STIM HORMONE: CPT

## 2024-08-30 NOTE — RESULT ENCOUNTER NOTE
Beltran Tavarez-  The cholesterol is improved   Thyroid is goo level   Rest of the labs are good range

## 2024-09-02 DIAGNOSIS — Z11.59 ENCOUNTER FOR HEPATITIS C SCREENING TEST FOR LOW RISK PATIENT: Primary | ICD-10-CM

## 2024-09-03 ASSESSMENT — PROMIS GLOBAL HEALTH SCALE
RATE_GENERAL_HEALTH: VERY GOOD
RATE_AVERAGE_FATIGUE: MILD
RATE_QUALITY_OF_LIFE: EXCELLENT
CARRYOUT_SOCIAL_ACTIVITIES: EXCELLENT
RATE_AVERAGE_PAIN: 0
EMOTIONAL_PROBLEMS: NEVER
RATE_SOCIAL_SATISFACTION: EXCELLENT
RATE_MENTAL_HEALTH: EXCELLENT
CARRYOUT_PHYSICAL_ACTIVITIES: COMPLETELY
RATE_PHYSICAL_HEALTH: VERY GOOD

## 2024-09-03 NOTE — PROGRESS NOTES
Subjective   Patient ID: Daysi Lloyd is a 49 y.o. female who presents for her annual physical       She saw the dietician once but she did not tell her anything she did not already know  Ms Garcia is taking a new position and patient was told she would have to see another dietician but no one called for appt     She wants to get the Shingrix vaccine but she is worried about getting sick from the vaccine  She was sick after having the COVID vaccines each time     She donates blood Q 8 weeks     HEALTH:  PAP 4/12, 8/15, 7/19, 7/19, 2021 - due 2025 w/ gyn  Mammo 11/17 abnormal left breast and +DCIS and s/p mastectomy, 1/18 US, 7/19 + fat necrosis, 7/20, 4/21, 6/23, 4/24  Diag mammo and US 4/23 benign area of fibrocystic tissue at 8 o'clock 8cmfn;    MRI breast 7/19, 11/20, 12/23  BD never   Colonoscopy 8/21 hyperplastic and Q 10   EKG 1/18, 3/20, 5/21, 8/23  Urine 8/19  Hep C ordered 9/24   Declines flu shot   TDAP 2011, 8/24/2023  MMR 8/16/19   Prevnar never   Pneumovax never   Shingrix recommended and will consider   Moderna CVD 2/21 and 3/21 booster 11/21, 12/22  Ophth She sees a doctor at  yearly and wears contacts. No history of glaucoma or MD        Review of Systems  All systems negative except those listed in the HPI      Past Medical, Surgical, and Family History reviewed and updated in chart.  Reviewed all medications by prescribing practitioner or clinical pharmacist   (such as prescriptions, OTCs, herbal therapies and supplements) and documented in the medical record       Objective   Visit Vitals  /70 (BP Location: Right arm, Patient Position: Sitting, BP Cuff Size: Adult)   Pulse 60   Temp 36.4 °C (97.5 °F) (Temporal)    Body mass index is 32.28 kg/m².      Physical Exam  Vitals reviewed.   Constitutional:       Appearance: Normal appearance. She is obese.   HENT:      Head: Normocephalic.      Right Ear: Tympanic membrane, ear canal and external ear normal.      Left Ear: Tympanic membrane,  ear canal and external ear normal.      Nose: Nose normal.      Mouth/Throat:      Pharynx: Oropharynx is clear.   Eyes:      Conjunctiva/sclera: Conjunctivae normal.   Cardiovascular:      Rate and Rhythm: Normal rate and regular rhythm.      Pulses: Normal pulses.      Heart sounds: Normal heart sounds.   Pulmonary:      Effort: Pulmonary effort is normal.      Breath sounds: Normal breath sounds.   Abdominal:      General: Bowel sounds are normal.      Palpations: Abdomen is soft.      Comments: Umbilical hernia, small: reduces easily    Musculoskeletal:         General: Normal range of motion.      Cervical back: Normal range of motion and neck supple.   Skin:     General: Skin is warm.   Neurological:      General: No focal deficit present.      Mental Status: She is alert and oriented to person, place, and time.   Psychiatric:         Mood and Affect: Mood normal.         Behavior: Behavior normal.         Thought Content: Thought content normal.         Judgment: Judgment normal.       Assessment/Plan   Problem List Items Addressed This Visit       DCIS (ductal carcinoma in situ)    Essential hypertension    History of breast reconstruction    Hypothyroidism    Malignant neoplasm of breast (Multi)    Mild intermittent reactive airway disease without complication (HHS-HCC)     Other Visit Diagnoses       Healthcare maintenance    -  Primary    BMI 32.0-32.9,adult                Annual physical completed  Reviewed her labs from 8/24    [facility] completed  -  Discussed healthy diet and regular exercise.    -  Physical exam overall unremarkable. Immunizations reviewed and updated accordingly. Healthy lifestyle choices discussed (tobacco avoidance, appropriate alcohol use, avoidance of illicit substances).   -  Patient is wearing seatbelt.   -  Screening lab work ordered as indicated.    -  Age appropriate screening tests reviewed with patient.       She is  with 3 children   She denies previous  history of tobacco use     She donates blood Q 8 weeks    Recommend she eat iron rich foods      Her weight is 200 pounds with BMI of 32.28. We spent 15 minutes discussing diet and weight loss. The struggle of weight loss persists    Explained goal for BMI to be 25 or less. I would like to see her BMI below 30   She saw Tiara Garcia in 4/24   Recommend she look into a plant based/ whole foods diet   Recommend she look into an antiinflammatory diet   Food options discussed in detail with patient 9/24      HTN onset of 5/21/2021: Stable.   Continue losartan 50 mg daily   She discontinued HCTZ thinking it caused loose stools   EKG was normal 8/2023, no LVH or strain pattern noted   She is monitoring her sodium intake   Recommend she monitor her BP at home and call with elevated readings      I have spent 15 min face to face with this patient discussing their cardiac risk   We discussed the patients cardiovascular risk. If needed, lifestyle modifications recommended including: behavioral therapies of nutrition choices, exercise and eliminate habits that are contributing to their cardiac risk. We agreed to a plan to decrease his cardiovascular risks. Discussed ASA. Reviewed Guidelines and approved recommendations made to patient.   The patient's 10 yr CV risk was estimated at  2.2 % 9/2024     Elevated lipids:  and HDL 47 on labs in 8/2024.   Explained goal for LDL to be less than 100 and ideally less than 70  I would like to see her HDL between 55- 60. Increase exercise   This is definitely familial hypercholesteremia   Recommend she look into a plant based/ whole foods diet       Hyperthyroid: TSH 2.39 on labs in 8/2024  Continue levothyroxine 50 mcg daily.   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   She saw Dr Ash in 5/2022 and Janel Kowalski in 5/2022    She has been struggling with increased stressors for a couple of years   She has seen a therapist in the past with the onset/ treatment of breast cancer   Discontinued Wellbutrin   Continue Buspar 15 mg prn     Abdominal plasty and breast surgery. Healed well.   She has a hard spot in her abdomen area and she feels like a \"wave\" sensation throughout intermittently   She has these episodes approx once a week   Demonstrated how to massage the abdomen     Umbilical hernia, small: reduces easily 9/24  We will continue to monitor      Vit D def: Levels 49 on labs in 9/2022   Continue OTC Vitamin D 5000 iu a day and Mg daily   Continue OTC Vitamin B 12 SL 1000- 5000 UT daily     PAP in 2021 with Gyn and HPV- so Q 5      ER+ DCIS Stage 0 LEFT Breast Cancer, SP mastectomy with BABITA reconstruction   - S/p Left simple mastectomy 2/28/18 and LN negative   Abnormal mammogram on 11/22/17, left breast biopsy 1/4/18 yielding DCIS, ER+KY+ and seen by Dr Rosales    She had reconstruction surgeries with Dr Hall in 6/18 and last in 9/18.  She had reconstruction of the left breast on 9/19 and final will be tattooing of the nipple. She is not sure she wants to undergo any further surgeries at this time.   She declines Tamoxifen due to low risk at 5% of recurrence.   She is not on any hormone blockers.   - Genetic testing was done, all negative BRCA genes in 2018. She is receptor positive.   CT images 1/2023, small metallic objects are present. These are probably surgical clips. Temporary AlloDerm was used and then subsequently removed. Diastasis rectus is apparent and no abdominal organomegaly, masses or lymphadenopathy are apparent. There are no hernias. She is reassured. These do not cause any pain and she is not having any pain or bulging at any point.   She saw Dr Oakes in 7/2023 and per notes:   Bilateral diagnostic mammogram with right ultrasound 4/2023 " showing no evidence of malignancy; there is a benign area of fibrocystic tissue at 8 o'clock 8cmfn  Mammo normal 4/24  MRI breasts 12/2023 normal   Breast exam is normal, no adhering of left implant 8/2023  She saw KARLO Oconnell in 6/2024 exam and mammo normal; MRI due 12/24     Colon 8/2021 hyperplastic and Q 10   BD at 53      Ophth:   She sees a doctor at  yearly and wears contacts. No history of glaucoma or MD  She will have her next eye exam faxed to my office in order to update her medical records.     Hep C ordered 9/24   Declines flu shot   TDAP 2011, 8/24/2023  MMR 8/16/19   Prevnar never   Pneumovax never   Shingrix recommended and will consider   Moderna CVD 2/21 and 3/21 booster 11/21, 12/22      Some elements in the chart were copied from Dr. Benson's last office visit with patient.   Notes have been updated where appropriate, and reflect my current medical decision making from today.      RTC in 6 months for follow up or sooner if needed   (CPE due 9/25, last cpe 9/5/24)      Scribe Attestation  By signing my name below, I, Precious Venkatesh , Scribe   attest that this documentation has been prepared under the direction and in the presence of Ayana Benson MD.

## 2024-09-05 ENCOUNTER — LAB (OUTPATIENT)
Dept: LAB | Facility: LAB | Age: 49
End: 2024-09-05
Payer: COMMERCIAL

## 2024-09-05 ENCOUNTER — APPOINTMENT (OUTPATIENT)
Dept: PRIMARY CARE | Facility: CLINIC | Age: 49
End: 2024-09-05
Payer: COMMERCIAL

## 2024-09-05 VITALS
DIASTOLIC BLOOD PRESSURE: 70 MMHG | HEIGHT: 66 IN | TEMPERATURE: 97.5 F | OXYGEN SATURATION: 98 % | BODY MASS INDEX: 32.14 KG/M2 | HEART RATE: 60 BPM | SYSTOLIC BLOOD PRESSURE: 120 MMHG | WEIGHT: 200 LBS

## 2024-09-05 DIAGNOSIS — D05.12 DUCTAL CARCINOMA IN SITU (DCIS) OF LEFT BREAST: ICD-10-CM

## 2024-09-05 DIAGNOSIS — I10 ESSENTIAL HYPERTENSION: ICD-10-CM

## 2024-09-05 DIAGNOSIS — E03.9 HYPOTHYROIDISM, UNSPECIFIED TYPE: ICD-10-CM

## 2024-09-05 DIAGNOSIS — K42.9 UMBILICAL HERNIA WITHOUT OBSTRUCTION AND WITHOUT GANGRENE: ICD-10-CM

## 2024-09-05 DIAGNOSIS — C50.912 MALIGNANT NEOPLASM OF LEFT BREAST IN FEMALE, ESTROGEN RECEPTOR POSITIVE, UNSPECIFIED SITE OF BREAST (MULTI): ICD-10-CM

## 2024-09-05 DIAGNOSIS — Z00.00 HEALTHCARE MAINTENANCE: Primary | ICD-10-CM

## 2024-09-05 DIAGNOSIS — Z98.890 HISTORY OF BREAST RECONSTRUCTION: ICD-10-CM

## 2024-09-05 DIAGNOSIS — J45.20 MILD INTERMITTENT REACTIVE AIRWAY DISEASE WITHOUT COMPLICATION (HHS-HCC): ICD-10-CM

## 2024-09-05 DIAGNOSIS — Z17.0 MALIGNANT NEOPLASM OF LEFT BREAST IN FEMALE, ESTROGEN RECEPTOR POSITIVE, UNSPECIFIED SITE OF BREAST (MULTI): ICD-10-CM

## 2024-09-05 DIAGNOSIS — Z11.59 ENCOUNTER FOR HEPATITIS C SCREENING TEST FOR LOW RISK PATIENT: ICD-10-CM

## 2024-09-05 PROBLEM — E66.9 OBESITY: Status: RESOLVED | Noted: 2024-04-05 | Resolved: 2024-09-05

## 2024-09-05 PROBLEM — R63.5 ABNORMAL WEIGHT GAIN: Status: RESOLVED | Noted: 2023-06-12 | Resolved: 2024-09-05

## 2024-09-05 PROBLEM — N64.89 BREAST ASYMMETRY: Status: RESOLVED | Noted: 2018-07-03 | Resolved: 2024-09-05

## 2024-09-05 PROBLEM — R92.8 MAMMOGRAM ABNORMAL: Status: RESOLVED | Noted: 2023-06-12 | Resolved: 2024-09-05

## 2024-09-05 LAB — HCV AB SER QL: NONREACTIVE

## 2024-09-05 PROCEDURE — 99396 PREV VISIT EST AGE 40-64: CPT | Performed by: INTERNAL MEDICINE

## 2024-09-05 PROCEDURE — 36415 COLL VENOUS BLD VENIPUNCTURE: CPT

## 2024-09-05 PROCEDURE — 3008F BODY MASS INDEX DOCD: CPT | Performed by: INTERNAL MEDICINE

## 2024-09-05 PROCEDURE — 3078F DIAST BP <80 MM HG: CPT | Performed by: INTERNAL MEDICINE

## 2024-09-05 PROCEDURE — 1036F TOBACCO NON-USER: CPT | Performed by: INTERNAL MEDICINE

## 2024-09-05 PROCEDURE — 3074F SYST BP LT 130 MM HG: CPT | Performed by: INTERNAL MEDICINE

## 2024-09-05 PROCEDURE — 86803 HEPATITIS C AB TEST: CPT

## 2024-09-05 RX ORDER — BISMUTH SUBSALICYLATE 262 MG
1 TABLET,CHEWABLE ORAL DAILY
COMMUNITY

## 2024-09-05 RX ORDER — POTASSIUM &MAGNESIUM ASPARTATE 250-250 MG
1 CAPSULE ORAL DAILY
COMMUNITY

## 2024-09-05 RX ORDER — GLUCOSAMINE/CHONDRO SU A 500-400 MG
1 TABLET ORAL DAILY
COMMUNITY

## 2024-09-05 NOTE — PATIENT INSTRUCTIONS
Current weight: 90.7 kg (200 lb)  Weight change since last visit (-) denotes wt loss -2 lbs   Weight loss needed to achieve BMI 25: 45.4 Lbs  Weight loss needed to achieve BMI 30: 14.5 Lbs    It was a pleasure to see you today.  I would like to remind you about importance of a healthy lifestyle in order to improve your well-being and live longer. Try to engage in physical activities for at least 150 minutes per week.  Eat about 10 servings of fruits and vegetables daily. My advice is 2 servings of fruits and 8 servings of vegetables. For vegetables choose at least half of them green and at least half of them fresh.  Please avoid sugar, salt, fried food and saturated fat.  Weight loss is advised. Target BMI: below 25. Please follow low carbohydrate diet and daily exercise routine for at least 30 minutes. Nutritional consultation is available, please let me know if you are interested. I will be happy to discuss details with you if interested.   Have a good day and stay well.      Obesity is a chronic, relapsing, progressive, treatable, multifactorial, neurobehavioral disease, where in an increase in body fat promotes adipose (fat) tissue dysfunction, as well as biomechanical stress on the body which can result in adverse metabolic, biomechanical, and psychosocial health consequences, in addition to reducing quality of life and lifespan.   Without treatment, obesity is likely to progress and worsen, further increase the patient's risk for numerous health conditions caused by excess adiposity and increasing the risk for premature death.     - Counseling provided on impact of diet, physical activity, stress & sleep in treatment of obesity.    - Counseled on reduced calorie, high protein, high fiber, whole foods diet.  Counseling on benefits of avoidance of frequent intake of processed foods and liquid calories.   - Counseled on behavioral modification strategies and tools to support weight loss.   - Reviewed  pathophysiology of obesity in context of relationship to nutrition, energy balance and environmental factors that influence nutrition and energy balance outcomes.  - Counseled on various behavioral strategies and self monitoring practices to enhance understanding and individual assessment of energy balance, how various changes in types of foods consumed and macronutrient distribution can impact appetite regulation and be used as a tool in treatment of obesity.       The ability to age comfortably depends on how you invest in your body.   Include physical activity in your daily routine. Physical activity increases blood flow to your whole body, including your brain. ...  Eat a healthy diet. A heart-healthy diet may benefit your brain.   Stay mentally active. Be social.   Treat cardiovascular disease.  No smoking, excessive EtOH intake or illicit drug use.

## 2024-09-06 NOTE — RESULT ENCOUNTER NOTE
HI the results are negative for Hep C 
Silver, Reese M (DPM)  Foot Surgery; [facility]  [address] [place] [address]  Phone: [phone]  Fax: [phone]  Established Patient  Follow Up Time: 1 week

## 2024-11-12 ENCOUNTER — TELEPHONE (OUTPATIENT)
Dept: PRIMARY CARE | Facility: CLINIC | Age: 49
End: 2024-11-12
Payer: COMMERCIAL

## 2024-11-12 NOTE — TELEPHONE ENCOUNTER
Pt has been having pain/numbness that is getting worse in her L pointer finger. 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 I tried to offer pt an opening with Dr Schuler but it wasn't late enough. Please advise. Thank you!

## 2024-11-26 ENCOUNTER — APPOINTMENT (OUTPATIENT)
Dept: ORTHOPEDIC SURGERY | Facility: CLINIC | Age: 49
End: 2024-11-26

## 2024-11-26 ENCOUNTER — OFFICE VISIT (OUTPATIENT)
Dept: ORTHOPEDIC SURGERY | Facility: CLINIC | Age: 49
End: 2024-11-26
Payer: COMMERCIAL

## 2024-11-26 VITALS — WEIGHT: 200 LBS | BODY MASS INDEX: 32.14 KG/M2 | HEIGHT: 66 IN

## 2024-11-26 DIAGNOSIS — G56.02 CARPAL TUNNEL SYNDROME OF LEFT WRIST: Primary | ICD-10-CM

## 2024-11-26 PROCEDURE — 99214 OFFICE O/P EST MOD 30 MIN: CPT | Performed by: ORTHOPAEDIC SURGERY

## 2024-11-26 PROCEDURE — 3008F BODY MASS INDEX DOCD: CPT | Performed by: ORTHOPAEDIC SURGERY

## 2024-11-26 PROCEDURE — 20526 THER INJECTION CARP TUNNEL: CPT | Mod: LT | Performed by: ORTHOPAEDIC SURGERY

## 2024-11-26 PROCEDURE — 1036F TOBACCO NON-USER: CPT | Performed by: ORTHOPAEDIC SURGERY

## 2024-11-26 PROCEDURE — 99204 OFFICE O/P NEW MOD 45 MIN: CPT | Performed by: ORTHOPAEDIC SURGERY

## 2024-11-26 PROCEDURE — 2500000004 HC RX 250 GENERAL PHARMACY W/ HCPCS (ALT 636 FOR OP/ED): Performed by: ORTHOPAEDIC SURGERY

## 2024-11-26 PROCEDURE — 20526 THER INJECTION CARP TUNNEL: CPT | Performed by: ORTHOPAEDIC SURGERY

## 2024-11-26 RX ORDER — LIDOCAINE HYDROCHLORIDE 10 MG/ML
1 INJECTION, SOLUTION INFILTRATION; PERINEURAL
Status: COMPLETED | OUTPATIENT
Start: 2024-11-26 | End: 2024-11-26

## 2024-11-26 NOTE — PROGRESS NOTES
11/26/2024    Chief Complaint   Patient presents with    Left Index Finger - New Patient Visit, Pain       History of Present Illness:  Patient Daysi Lloyd , 49 y.o. female, presents today, 11/26/2024, for evaluation of left hand pain and numbness.  This is primarily affecting left index and thumb.  She is a right-hand-dominant individual.  Otherwise healthy.  Symptoms began in October after yoga.  She feels that pain radiates up the forearm into the elbow and sometimes even up to the shoulder and neck.  She has some nighttime symptoms that occasionally wake her from sleep.  She denies any zak injury no falls.  She did mention this to her  who felt she might be having some tendinitis of the elbow and recommended she consider acupuncture, however, patient was unable to facilitate this on her own.       Review of Systems:   GENERAL: Negative  GI: Negative  MUSCULOSKELETAL: See HPI  SKIN: Negative  NEURO:  Negative     Physical Exam:  GENERAL:  Alert and oriented to person, place, and time.  No acute distress and breathing comfortably; pleasant and cooperative with the examination.  HEENT:  Head is normocephalic and atraumatic.  NECK:  Supple, no visible swelling.  CARDIOVASCULAR:  No palpable tachycardia.  LUNGS:  No audible wheezing or labored breathing.  ABDOMEN:  Nondistended.  Extremities: Evaluation of left upper extremity finds the patient to have a palpable radial artery at the wrist with brisk capillary refill to all digits. The patient has intact sensorium to axillary, radial, median and ulnar nerves. There are no open wounds. There are no signs of infection. There is no evidence of lymphedema or lymphatic streaking. The patient has supple compartments of the left arm, forearm and hand.  Very minimally positive Tinel's over the median nerve on exam today.  She does have positive Durkan's compression maneuver.  Very mildly tender over the left elbow lateral epicondyle.  Negative  Spurling's maneuver.     Imaging/Test Results:  None today.     Assessment:  Concerns for nerve compression of the left upper extremity with concerns for developing left carpal tunnel syndrome versus component of cervical radiculopathy as well.     Plan:  Operative and nonoperative treatment strategies were discussed.  Recommendations were made for initial nonoperative management through diagnostic/therapeutic injection.  Injection was performed by Dr. Villareal and tolerated well by patient.  We will give them a nighttime brace.  We will order and help facilitate EMG nerve conduction test to assess for nerve compression for both carpal tunnel syndrome and cervical radiculopathy.  Follow-up in approximately 4 weeks for repeat clinical exam, after EMG nerve conduction test has been performed.  No radiographs necessary upon return.  Patient verbalized agreement and understanding of plan for care.  All questions answered at today's visit.      Hand / UE Inj/Asp: L carpal tunnel for carpal tunnel syndrome on 11/26/2024 9:18 AM  Indications: diagnostic and therapeutic  Details: 25 G needle, volar approach  Medications: 10 mg triamcinolone acetonide 10 mg/mL; 1 mL lidocaine 10 mg/mL (1 %)  Outcome: tolerated well, no immediate complications    Left Carpal Tunnel Injection: It was explained to the patient that the risks of a steroid injection include but are not limited to infection, local skin irritation, skin atrophy, calcification, continued pain and discomfort, elevated blood sugar, burning, failure to relieve pain, and possible late infection. The patient verbalized good insight and verbalized consent for the injection. It was further explained that the post-injection discomfort can be alleviated with additional medications, ice, elevation, and rest over the first 24 hours, and that these modalities are recommended.  After informed consent was provided, patient identification was confirmed, and allergies were verified,  the patient was appropriately positioned. The site was marked and time-out performed.    Using aseptic technique, a solution containing 1 mL of 10 mg of Kenalog and 1 mL of 1% lidocaine without epinephrine was injected into the patient's left carpal tunnel. A 25-gauge needle was inserted just ulnar to the anatomic course of the palmaris longus tendon at the level of the distal wrist flexion crease. It was slowly advanced deep to the distal antebrachial fascia. The solution was then injected slowly, taking care to ensure that the patient experienced no paresthesias during the injection of the solution. After injection of the solution, the patient was asked to perform active flexion and extension of the digits and wrist to help disperse the solution. A band-aid was then placed at the injection site. The patient tolerated this left carpal tunnel injection well.      Procedure, treatment alternatives, risks and benefits explained, specific risks discussed. Consent was given by the patient. Immediately prior to procedure a time out was called to verify the correct patient, procedure, equipment, support staff and site/side marked as required. Patient was prepped and draped in the usual sterile fashion.             In a face to face encounter, I performed a history and physical examination, discussed pertinent diagnostic studies if indicated, and discussed diagnosis and management strategies with both the patient and the mid-level provider. I reviewed the mid-level's note and agree with the documented findings and plan of care.  Patient presents today for evaluation of complex left upper extremity pain numbness and tingling.  She describes symptoms potentially compatible with cervical radiculopathy.  Positive Tinel's over course of median nerve to left wrist.  Positive dry compression test on the left.  Treatment options were discussed.  Recommendations were made for diagnostic/therapeutic injection to the left carpal  tunnel and for EMG nerve conduction study test.  She was given a brace for carpal tunnel syndrome as well.  Patient is agreeable with this strategy.

## 2024-12-05 ENCOUNTER — TELEPHONE (OUTPATIENT)
Dept: ORTHOPEDIC SURGERY | Facility: CLINIC | Age: 49
End: 2024-12-05
Payer: COMMERCIAL

## 2024-12-05 NOTE — TELEPHONE ENCOUNTER
Patient called regarding her EMG (the EMG order was sent to Dr. lAbright on 11/27) they haven't called to schedule it yet. The lauren inj has helped, does she still need the EMG?    She can be reached at [phone]

## 2025-01-07 DIAGNOSIS — E03.9 HYPOTHYROIDISM, UNSPECIFIED TYPE: ICD-10-CM

## 2025-01-08 RX ORDER — LEVOTHYROXINE SODIUM 50 UG/1
50 TABLET ORAL DAILY
Qty: 90 TABLET | Refills: 0 | Status: SHIPPED | OUTPATIENT
Start: 2025-01-08

## 2025-04-06 DIAGNOSIS — E03.9 HYPOTHYROIDISM, UNSPECIFIED TYPE: ICD-10-CM

## 2025-04-07 RX ORDER — LEVOTHYROXINE SODIUM 50 UG/1
50 TABLET ORAL DAILY
Qty: 90 TABLET | Refills: 0 | Status: SHIPPED | OUTPATIENT
Start: 2025-04-07

## 2025-04-23 ENCOUNTER — TELEPHONE (OUTPATIENT)
Dept: PRIMARY CARE | Facility: CLINIC | Age: 50
End: 2025-04-23
Payer: COMMERCIAL

## 2025-04-23 NOTE — TELEPHONE ENCOUNTER
Pt has lump on her neck  for a few days. and is feeling lightheaded and would like to see Dr. Benson.please advise

## 2025-04-23 NOTE — PROGRESS NOTES
Subjective   Patient ID: Daysi Lloyd is a 50 y.o. female who presents for evaluation for lightheadedness, dizziness, congestion, ears clogged and allergies         She has dizziness/lightheadedness, 2 lumps on the right side of her neck  She is very congested. Her allergies are terrible this year. Her ears are a bit clogged   She denies ear pain. Her Sx began Monday 4/21/25. She admits she has had BPPV in the past   She has dizziness when moving her head. The Epley maneuvers in the past have helped  The lumps on her neck appeared Monday 4/21/24. There is tenderness to the areas   She has remained afebrile. She has been taking Valtrex due to having a cold sore - her last does was approx a week ago.        HEALTH:  PAP 4/12, 8/15, 7/19, 7/19, 2021 - due 2025 w/ gyn  Mammo 11/17 abnormal left breast and +DCIS and s/p mastectomy, 1/18 US, 7/19 + fat necrosis, 7/20, 4/21, 6/23, 4/24  Diag mammo and US 4/23 benign area of fibrocystic tissue at 8 o'clock 8cmfn;    MRI breast 7/19, 11/20, 12/23, 1/25  BD never   Colonoscopy 8/21 hyperplastic and Q 10   EKG 1/18, 3/20, 5/21, 8/23  Urine 8/19  Hep C normal 9/24   Declines flu shot   TDAP 2011, 8/24/2023  MMR 8/16/19   Prevnar never   Pneumovax never   Shingrix 2/18/25  SARS-CoV-2- 2/21, 3/21, 11/21, 12/22  Ophth She sees a doctor at  yearly and wears contacts. No history of glaucoma or MD        Review of Systems  All systems negative except those listed in the HPI      Objective   Visit Vitals  /60 (BP Location: Left arm, Patient Position: Sitting, BP Cuff Size: Adult)   Pulse 78   Temp 36.2 °C (97.1 °F)    Body mass index is 32.28 kg/m².      Physical Exam  Vitals reviewed.   Constitutional:       Appearance: Normal appearance. She is obese.   HENT:      Head: Normocephalic.      Right Ear: Tympanic membrane, ear canal and external ear normal.      Left Ear: Tympanic membrane, ear canal and external ear normal.      Nose: Nose normal.      Mouth/Throat:       Pharynx: Oropharynx is clear.   Eyes:      Conjunctiva/sclera: Conjunctivae normal.   Cardiovascular:      Rate and Rhythm: Normal rate and regular rhythm.      Pulses: Normal pulses.      Heart sounds: Normal heart sounds.   Pulmonary:      Effort: Pulmonary effort is normal.      Breath sounds: Normal breath sounds.   Abdominal:      General: Bowel sounds are normal.      Palpations: Abdomen is soft.      Comments: No hepatosplenomegaly    Musculoskeletal:         General: Normal range of motion.      Cervical back: Normal range of motion and neck supple.   Lymphadenopathy:      Comments: small posterior LN 1 cm bilaterally cluster of 2-3 on each side of her neck   Skin:     General: Skin is warm.      Comments: 3/4 cm crusted hard lesion erythema right side of neck   Neurological:      General: No focal deficit present.      Mental Status: She is alert and oriented to person, place, and time.   Psychiatric:         Mood and Affect: Mood normal.         Behavior: Behavior normal.         Thought Content: Thought content normal.         Judgment: Judgment normal.       Assessment/Plan   Problem List Items Addressed This Visit       DCIS (ductal carcinoma in situ)    Essential hypertension - Primary    Hypothyroidism    Mild intermittent reactive airway disease without complication (HHS-HCC)     Other Visit Diagnoses         Skin lesion of neck          Herpes zoster without complication                Follow up completed  Reviewed her labs from 9/24     She is  with 3 children   She denies previous history of tobacco use     Acute skin lesion on neck/ ?HZV : On exam: 3/4 cm crusted hard lesion erythema right side of neck, small posterior LN 1 cm bilaterally cluster of 2-3 on each side of her neck, soft not mobile, nothing in the ear or mouth, BPPV to the right side 4/25. Ddx discussed with patient 4/25. The scabbed area could be an insect (spider) bite versus herpetic sore and the LN are reacting.   She has  dizziness/lightheadedness, 2 lumps on the right side of her neck  She is very congested. Her allergies are terrible this year. Her ears are a bit clogged   She denies ear pain. Her Sx began Monday 4/21/25.   She admits she has had BPPV in the past. She has remained afebrile. She has been taking Valtrex due to having a cold sore - her last does was approx a week ago.   She has dizziness when moving her head. The Epley maneuvers in the past have helped  The lumps on her neck appeared Monday 4/21/24. There is tenderness to the areas   Prescription sent in for Valtrex 1 gram to take as directed, possible side effects discussed   Prescription sent in for Keflex 250 mg BID for 10 days, possible side effects discussed  Prescription sent in for mupirocin gricel to apply as directed to the scabbed are    She will call/ message through My Chart me Monday 4/28/25 and if NI with LN we will order CT of neck for further evaluation 4/25. Patient states understanding of our discussion.      Her weight is 200 pounds with BMI of 32.28. We spent 15 minutes discussing diet and weight loss. The struggle of weight loss persists    I would like to see her BMI below 30   She saw Tiara Garcia in 4/24   Recommend she look into a plant based/ whole foods diet   She saw Dr Braun in 2/25 for weight management      HTN onset of 5/21: BP Stable.   Continue losartan 50 mg daily   She discontinued HCTZ thinking it caused loose stools   EKG normal 8/2023, no LVH or strain pattern noted   She is monitoring her sodium intake   Recommend she monitor her BP at home and call with elevated readings      I have spent 15 min face to face with this patient discussing their cardiac risk   We discussed the patients cardiovascular risk. If needed, lifestyle modifications recommended including: behavioral therapies of nutrition choices, exercise and eliminate habits that are contributing to their cardiac risk. We agreed to a plan to decrease his  "cardiovascular risks. Discussed ASA. Reviewed Guidelines and approved recommendations made to patient.   The patient's 10 yr CV risk was estimated at  2 % IO 4/25      Elevated lipids:  and HDL 47 on labs in 8/2024.   Explained goal for LDL to be less than 100 and ideally less than 70  I would like to see her HDL between 55- 60. Increase exercise   This is definitely familial hypercholesteremia   Recommend she look into a plant based/ whole foods diet       Hyperthyroid: TSH 2.39 on labs in 8/2024  Continue levothyroxine 50 mcg daily.   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   She saw Dr Ash in 5/2022 and Janel Kowalski in 5/2022    She has been struggling with increased stressors for a couple of years   She has seen a therapist in the past with the onset/ treatment of breast cancer   Discontinued Wellbutrin   Continue Buspar 15 mg prn     Abdominal plasty and breast surgery. Healed well.   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  She had reconstruction of the left breast on " 9/19 and final will be tattooing of the nipple. She is not sure she wants to undergo any further surgeries at this time.   She declines Tamoxifen due to low risk at 5% of recurrence.   She is not on any hormone blockers.   - Genetic testing was done, all negative BRCA genes in 2018. She is receptor positive.   CT images 1/2023, small metallic objects are present. These are probably surgical clips. Temporary AlloDerm was used and then subsequently removed. Diastasis rectus is apparent and no abdominal organomegaly, masses or lymphadenopathy are apparent. There are no hernias. She is reassured. These do not cause any pain and she is not having any pain or bulging at any point.   She saw Dr Oakes in 7/2023 and per notes:   Bilateral diagnostic mammogram with right ultrasound 4/2023 showing no evidence of malignancy; there is a benign area of fibrocystic tissue at 8 o'clock 8cmfn  Mammo normal 4/24  MRI breasts 1/25 normal   Breast exam is normal, no adhering of left implant 8/2023  She saw KARLO Oconnell in 1/25 exam and MRI normal     Colon 8/2021 hyperplastic and Q 10   BD at 53      Ophth:   She sees a doctor at  yearly and wears contacts. No history of glaucoma or MD  She will have her next eye exam faxed to my office in order to update her medical records.     Hep C normal 9/24   Declines flu shot   TDAP 2011, 8/24/2023  MMR 8/16/19   Prevnar never   Pneumovax never   Shingrix 2/18/25  SARS-CoV-2- 2/21, 3/21, 11/21, 12/22     Some elements in the chart were copied from Dr. Benson's last office visit with patient.   Notes have been updated where appropriate, and reflect my current medical decision making from today.      Keep scheduled appt in 9/25 for CPE or sooner if needed   (CPE due 9/25, last cpe 9/5/24)      Scribe Attestation  By signing my name below, I Precious Byrd , Scribe   attest that this documentation has been prepared under the direction and in the presence of Ayana Benson MD.

## 2025-04-24 ENCOUNTER — OFFICE VISIT (OUTPATIENT)
Dept: PRIMARY CARE | Facility: CLINIC | Age: 50
End: 2025-04-24
Payer: COMMERCIAL

## 2025-04-24 VITALS
HEART RATE: 78 BPM | DIASTOLIC BLOOD PRESSURE: 60 MMHG | BODY MASS INDEX: 32.14 KG/M2 | SYSTOLIC BLOOD PRESSURE: 112 MMHG | WEIGHT: 200 LBS | HEIGHT: 66 IN | TEMPERATURE: 97.1 F | OXYGEN SATURATION: 98 %

## 2025-04-24 DIAGNOSIS — L98.9 SKIN LESION OF NECK: Primary | ICD-10-CM

## 2025-04-24 DIAGNOSIS — E03.9 HYPOTHYROIDISM, UNSPECIFIED TYPE: ICD-10-CM

## 2025-04-24 DIAGNOSIS — H81.11 BENIGN PAROXYSMAL POSITIONAL VERTIGO OF RIGHT EAR: ICD-10-CM

## 2025-04-24 DIAGNOSIS — R59.0 POSTERIOR CERVICAL ADENOPATHY: ICD-10-CM

## 2025-04-24 DIAGNOSIS — J45.20 MILD INTERMITTENT REACTIVE AIRWAY DISEASE WITHOUT COMPLICATION (HHS-HCC): ICD-10-CM

## 2025-04-24 DIAGNOSIS — B02.9 HERPES ZOSTER WITHOUT COMPLICATION: ICD-10-CM

## 2025-04-24 DIAGNOSIS — I10 ESSENTIAL HYPERTENSION: ICD-10-CM

## 2025-04-24 DIAGNOSIS — D05.12 DUCTAL CARCINOMA IN SITU (DCIS) OF LEFT BREAST: ICD-10-CM

## 2025-04-24 PROCEDURE — 99214 OFFICE O/P EST MOD 30 MIN: CPT | Performed by: INTERNAL MEDICINE

## 2025-04-24 PROCEDURE — 1036F TOBACCO NON-USER: CPT | Performed by: INTERNAL MEDICINE

## 2025-04-24 PROCEDURE — 3074F SYST BP LT 130 MM HG: CPT | Performed by: INTERNAL MEDICINE

## 2025-04-24 PROCEDURE — 3008F BODY MASS INDEX DOCD: CPT | Performed by: INTERNAL MEDICINE

## 2025-04-24 PROCEDURE — 3078F DIAST BP <80 MM HG: CPT | Performed by: INTERNAL MEDICINE

## 2025-04-24 RX ORDER — VALACYCLOVIR HYDROCHLORIDE 1 G/1
1000 TABLET, FILM COATED ORAL 3 TIMES DAILY
Qty: 21 TABLET | Refills: 0 | Status: SHIPPED | OUTPATIENT
Start: 2025-04-24 | End: 2025-05-01

## 2025-04-24 RX ORDER — CEPHALEXIN 500 MG/1
500 CAPSULE ORAL 3 TIMES DAILY
Qty: 30 CAPSULE | Refills: 0 | Status: SHIPPED | OUTPATIENT
Start: 2025-04-24 | End: 2025-05-04

## 2025-04-24 RX ORDER — MUPIROCIN 20 MG/G
OINTMENT TOPICAL 3 TIMES DAILY
Qty: 22 G | Refills: 0 | Status: SHIPPED | OUTPATIENT
Start: 2025-04-24 | End: 2025-05-04

## 2025-04-28 ENCOUNTER — TELEPHONE (OUTPATIENT)
Dept: PRIMARY CARE | Facility: CLINIC | Age: 50
End: 2025-04-28
Payer: COMMERCIAL

## 2025-07-11 DIAGNOSIS — E03.9 HYPOTHYROIDISM, UNSPECIFIED TYPE: ICD-10-CM

## 2025-07-11 RX ORDER — LEVOTHYROXINE SODIUM 50 UG/1
50 TABLET ORAL DAILY
Qty: 90 TABLET | Refills: 0 | Status: SHIPPED | OUTPATIENT
Start: 2025-07-11

## 2025-08-01 DIAGNOSIS — I10 ESSENTIAL HYPERTENSION: ICD-10-CM

## 2025-08-01 RX ORDER — LOSARTAN POTASSIUM 50 MG/1
50 TABLET ORAL DAILY
Qty: 30 TABLET | Refills: 3 | Status: SHIPPED | OUTPATIENT
Start: 2025-08-01

## 2025-09-08 ENCOUNTER — APPOINTMENT (OUTPATIENT)
Dept: PRIMARY CARE | Facility: CLINIC | Age: 50
End: 2025-09-08
Payer: COMMERCIAL

## 2025-12-04 ENCOUNTER — APPOINTMENT (OUTPATIENT)
Dept: PRIMARY CARE | Facility: CLINIC | Age: 50
End: 2025-12-04
Payer: COMMERCIAL